# Patient Record
Sex: MALE | Race: WHITE | Employment: OTHER | URBAN - NONMETROPOLITAN AREA
[De-identification: names, ages, dates, MRNs, and addresses within clinical notes are randomized per-mention and may not be internally consistent; named-entity substitution may affect disease eponyms.]

---

## 2018-02-07 ENCOUNTER — HOSPITAL ENCOUNTER (INPATIENT)
Age: 83
LOS: 4 days | Discharge: HOME | DRG: 641 | End: 2018-02-11
Attending: INTERNAL MEDICINE | Admitting: INTERNAL MEDICINE
Payer: MEDICARE

## 2018-02-07 PROBLEM — E87.1 HYPONATREMIA: Status: ACTIVE | Noted: 2018-02-07

## 2018-02-07 LAB
ALBUMIN SERPL-MCNC: 3.8 G/DL (ref 3.5–5.2)
ALP BLD-CCNC: 72 U/L (ref 40–130)
ALT SERPL-CCNC: 9 U/L (ref 5–41)
AMORPHOUS: ABNORMAL /HPF
ANION GAP SERPL CALCULATED.3IONS-SCNC: 10 MMOL/L (ref 7–19)
ANION GAP SERPL CALCULATED.3IONS-SCNC: 11 MMOL/L (ref 7–19)
ANION GAP SERPL CALCULATED.3IONS-SCNC: 8 MMOL/L (ref 7–19)
AST SERPL-CCNC: 18 U/L (ref 5–40)
BACTERIA: ABNORMAL /HPF
BASOPHILS ABSOLUTE: 0.1 K/UL (ref 0–0.2)
BASOPHILS RELATIVE PERCENT: 0.4 % (ref 0–1)
BILIRUB SERPL-MCNC: 0.6 MG/DL (ref 0.2–1.2)
BILIRUBIN URINE: NEGATIVE
BLOOD, URINE: ABNORMAL
BUN BLDV-MCNC: 10 MG/DL (ref 8–23)
CALCIUM SERPL-MCNC: 8 MG/DL (ref 8.8–10.2)
CALCIUM SERPL-MCNC: 8 MG/DL (ref 8.8–10.2)
CALCIUM SERPL-MCNC: 8.1 MG/DL (ref 8.8–10.2)
CHLORIDE BLD-SCNC: 82 MMOL/L (ref 98–111)
CHLORIDE BLD-SCNC: 83 MMOL/L (ref 98–111)
CHLORIDE BLD-SCNC: 83 MMOL/L (ref 98–111)
CHLORIDE BLD-SCNC: 86 MMOL/L (ref 98–111)
CHLORIDE BLD-SCNC: 88 MMOL/L (ref 98–111)
CLARITY: ABNORMAL
CO2: 25 MMOL/L (ref 22–29)
CO2: 26 MMOL/L (ref 22–29)
CO2: 27 MMOL/L (ref 22–29)
COLOR: YELLOW
CORTISOL TOTAL: 13.2 UG/DL
CREAT SERPL-MCNC: 0.6 MG/DL (ref 0.5–1.2)
CREAT SERPL-MCNC: 0.6 MG/DL (ref 0.5–1.2)
CREAT SERPL-MCNC: 0.7 MG/DL (ref 0.5–1.2)
EOSINOPHILS ABSOLUTE: 0.6 K/UL (ref 0–0.6)
EOSINOPHILS RELATIVE PERCENT: 4.2 % (ref 0–5)
GFR NON-AFRICAN AMERICAN: >60
GLUCOSE BLD-MCNC: 117 MG/DL (ref 74–109)
GLUCOSE BLD-MCNC: 124 MG/DL (ref 74–109)
GLUCOSE BLD-MCNC: 125 MG/DL (ref 74–109)
GLUCOSE BLD-MCNC: 127 MG/DL (ref 74–109)
GLUCOSE BLD-MCNC: 156 MG/DL (ref 74–109)
GLUCOSE URINE: NEGATIVE MG/DL
HCT VFR BLD CALC: 31.8 % (ref 42–52)
HEMOGLOBIN: 11 G/DL (ref 14–18)
KETONES, URINE: NEGATIVE MG/DL
LEUKOCYTE ESTERASE, URINE: ABNORMAL
LYMPHOCYTES ABSOLUTE: 0.6 K/UL (ref 1.1–4.5)
LYMPHOCYTES RELATIVE PERCENT: 4.3 % (ref 20–40)
MAGNESIUM: 1.6 MG/DL (ref 1.6–2.4)
MCH RBC QN AUTO: 31.6 PG (ref 27–31)
MCHC RBC AUTO-ENTMCNC: 34.6 G/DL (ref 33–37)
MCV RBC AUTO: 91.4 FL (ref 80–94)
MONOCYTES ABSOLUTE: 0.9 K/UL (ref 0–0.9)
MONOCYTES RELATIVE PERCENT: 5.9 % (ref 0–10)
MUCUS: ABNORMAL /LPF
NEUTROPHILS ABSOLUTE: 12.4 K/UL (ref 1.5–7.5)
NEUTROPHILS RELATIVE PERCENT: 84.8 % (ref 50–65)
NITRITE, URINE: NEGATIVE
OSMOLALITY URINE: 518 MOSM/KG (ref 250–1200)
OSMOLALITY: 248 MOSM/KG (ref 275–300)
PDW BLD-RTO: 11.8 % (ref 11.5–14.5)
PH UA: 7.5
PHENYTOIN LEVEL: 8.7 UG/ML (ref 10–20)
PLATELET # BLD: 230 K/UL (ref 130–400)
PMV BLD AUTO: 9.1 FL (ref 9.4–12.4)
POTASSIUM REFLEX MAGNESIUM: 4.2 MMOL/L (ref 3.5–5)
POTASSIUM REFLEX MAGNESIUM: 4.6 MMOL/L (ref 3.5–5)
POTASSIUM REFLEX MAGNESIUM: 4.8 MMOL/L (ref 3.5–5)
POTASSIUM REFLEX MAGNESIUM: 4.8 MMOL/L (ref 3.5–5)
POTASSIUM SERPL-SCNC: 4.2 MMOL/L (ref 3.5–5)
PROTEIN UA: 100 MG/DL
RBC # BLD: 3.48 M/UL (ref 4.7–6.1)
RBC UA: ABNORMAL /HPF (ref 0–2)
SODIUM BLD-SCNC: 118 MMOL/L (ref 136–145)
SODIUM BLD-SCNC: 118 MMOL/L (ref 136–145)
SODIUM BLD-SCNC: 120 MMOL/L (ref 136–145)
SODIUM BLD-SCNC: 121 MMOL/L (ref 136–145)
SODIUM BLD-SCNC: 121 MMOL/L (ref 136–145)
SODIUM BLD-SCNC: 122 MMOL/L (ref 136–145)
SODIUM BLD-SCNC: 124 MMOL/L (ref 136–145)
SODIUM URINE: 133 MMOL/L
SPECIFIC GRAVITY UA: 1.02
TOTAL PROTEIN: 6.4 G/DL (ref 6.6–8.7)
TRIGL SERPL-MCNC: 74 MG/DL (ref 0–149)
TROPONIN: <0.01 NG/ML (ref 0–0.03)
TROPONIN: <0.01 NG/ML (ref 0–0.03)
TSH SERPL DL<=0.05 MIU/L-ACNC: 3.23 UIU/ML (ref 0.27–4.2)
UROBILINOGEN, URINE: 0.2 E.U./DL
WBC # BLD: 14.7 K/UL (ref 4.8–10.8)
WBC UA: ABNORMAL /HPF (ref 0–5)

## 2018-02-07 PROCEDURE — 84295 ASSAY OF SERUM SODIUM: CPT

## 2018-02-07 PROCEDURE — 2580000003 HC RX 258: Performed by: INTERNAL MEDICINE

## 2018-02-07 PROCEDURE — 99222 1ST HOSP IP/OBS MODERATE 55: CPT | Performed by: UROLOGY

## 2018-02-07 PROCEDURE — 94640 AIRWAY INHALATION TREATMENT: CPT

## 2018-02-07 PROCEDURE — 6370000000 HC RX 637 (ALT 250 FOR IP): Performed by: INTERNAL MEDICINE

## 2018-02-07 PROCEDURE — 81001 URINALYSIS AUTO W/SCOPE: CPT

## 2018-02-07 PROCEDURE — 6370000000 HC RX 637 (ALT 250 FOR IP): Performed by: UROLOGY

## 2018-02-07 PROCEDURE — 85025 COMPLETE CBC W/AUTO DIFF WBC: CPT

## 2018-02-07 PROCEDURE — 82533 TOTAL CORTISOL: CPT

## 2018-02-07 PROCEDURE — 87086 URINE CULTURE/COLONY COUNT: CPT

## 2018-02-07 PROCEDURE — 83735 ASSAY OF MAGNESIUM: CPT

## 2018-02-07 PROCEDURE — 99291 CRITICAL CARE FIRST HOUR: CPT | Performed by: INTERNAL MEDICINE

## 2018-02-07 PROCEDURE — 84300 ASSAY OF URINE SODIUM: CPT

## 2018-02-07 PROCEDURE — 84484 ASSAY OF TROPONIN QUANT: CPT

## 2018-02-07 PROCEDURE — 6360000002 HC RX W HCPCS: Performed by: INTERNAL MEDICINE

## 2018-02-07 PROCEDURE — 2000000000 HC ICU R&B

## 2018-02-07 PROCEDURE — 2700000000 HC OXYGEN THERAPY PER DAY

## 2018-02-07 PROCEDURE — 83935 ASSAY OF URINE OSMOLALITY: CPT

## 2018-02-07 PROCEDURE — 80053 COMPREHEN METABOLIC PANEL: CPT

## 2018-02-07 PROCEDURE — 84478 ASSAY OF TRIGLYCERIDES: CPT

## 2018-02-07 PROCEDURE — 83930 ASSAY OF BLOOD OSMOLALITY: CPT

## 2018-02-07 PROCEDURE — 84443 ASSAY THYROID STIM HORMONE: CPT

## 2018-02-07 PROCEDURE — 36415 COLL VENOUS BLD VENIPUNCTURE: CPT

## 2018-02-07 PROCEDURE — 87070 CULTURE OTHR SPECIMN AEROBIC: CPT

## 2018-02-07 PROCEDURE — 80185 ASSAY OF PHENYTOIN TOTAL: CPT

## 2018-02-07 RX ORDER — FINASTERIDE 5 MG/1
5 TABLET, FILM COATED ORAL DAILY
Status: DISCONTINUED | OUTPATIENT
Start: 2018-02-07 | End: 2018-02-11 | Stop reason: HOSPADM

## 2018-02-07 RX ORDER — TAMSULOSIN HYDROCHLORIDE 0.4 MG/1
0.8 CAPSULE ORAL DAILY
Status: DISCONTINUED | OUTPATIENT
Start: 2018-02-08 | End: 2018-02-07

## 2018-02-07 RX ORDER — PANTOPRAZOLE SODIUM 40 MG/1
40 GRANULE, DELAYED RELEASE ORAL
Status: DISCONTINUED | OUTPATIENT
Start: 2018-02-07 | End: 2018-02-07

## 2018-02-07 RX ORDER — PHENYTOIN SODIUM 100 MG/1
200 CAPSULE, EXTENDED RELEASE ORAL 2 TIMES DAILY
Status: DISCONTINUED | OUTPATIENT
Start: 2018-02-07 | End: 2018-02-11 | Stop reason: HOSPADM

## 2018-02-07 RX ORDER — DOCUSATE SODIUM 100 MG/1
100 CAPSULE, LIQUID FILLED ORAL 2 TIMES DAILY
Status: DISCONTINUED | OUTPATIENT
Start: 2018-02-07 | End: 2018-02-09

## 2018-02-07 RX ORDER — METOPROLOL TARTRATE 50 MG/1
50 TABLET, FILM COATED ORAL 2 TIMES DAILY
COMMUNITY

## 2018-02-07 RX ORDER — AMLODIPINE BESYLATE 10 MG/1
10 TABLET ORAL DAILY
Status: DISCONTINUED | OUTPATIENT
Start: 2018-02-07 | End: 2018-02-11 | Stop reason: HOSPADM

## 2018-02-07 RX ORDER — PANTOPRAZOLE SODIUM 40 MG/1
40 TABLET, DELAYED RELEASE ORAL
Status: DISCONTINUED | OUTPATIENT
Start: 2018-02-07 | End: 2018-02-11 | Stop reason: HOSPADM

## 2018-02-07 RX ORDER — PHENYTOIN SODIUM 100 MG/1
200 CAPSULE, EXTENDED RELEASE ORAL 2 TIMES DAILY
COMMUNITY

## 2018-02-07 RX ORDER — ASPIRIN 81 MG/1
81 TABLET ORAL DAILY
Status: DISCONTINUED | OUTPATIENT
Start: 2018-02-07 | End: 2018-02-11 | Stop reason: HOSPADM

## 2018-02-07 RX ORDER — SODIUM CHLORIDE 0.9 % (FLUSH) 0.9 %
10 SYRINGE (ML) INJECTION EVERY 12 HOURS SCHEDULED
Status: DISCONTINUED | OUTPATIENT
Start: 2018-02-07 | End: 2018-02-11 | Stop reason: HOSPADM

## 2018-02-07 RX ORDER — SODIUM CHLORIDE 0.9 % (FLUSH) 0.9 %
10 SYRINGE (ML) INJECTION PRN
Status: DISCONTINUED | OUTPATIENT
Start: 2018-02-07 | End: 2018-02-11 | Stop reason: HOSPADM

## 2018-02-07 RX ORDER — POTASSIUM CHLORIDE 7.45 MG/ML
10 INJECTION INTRAVENOUS PRN
Status: DISCONTINUED | OUTPATIENT
Start: 2018-02-07 | End: 2018-02-07

## 2018-02-07 RX ORDER — 3% SODIUM CHLORIDE 3 G/100ML
50 INJECTION, SOLUTION INTRAVENOUS CONTINUOUS
Status: DISPENSED | OUTPATIENT
Start: 2018-02-07 | End: 2018-02-07

## 2018-02-07 RX ORDER — ONDANSETRON 2 MG/ML
4 INJECTION INTRAMUSCULAR; INTRAVENOUS EVERY 6 HOURS PRN
Status: DISCONTINUED | OUTPATIENT
Start: 2018-02-07 | End: 2018-02-11 | Stop reason: HOSPADM

## 2018-02-07 RX ORDER — TAMSULOSIN HYDROCHLORIDE 0.4 MG/1
0.8 CAPSULE ORAL DAILY
Status: DISCONTINUED | OUTPATIENT
Start: 2018-02-07 | End: 2018-02-11 | Stop reason: HOSPADM

## 2018-02-07 RX ORDER — ALPRAZOLAM 1 MG/1
1 TABLET ORAL 4 TIMES DAILY PRN
COMMUNITY

## 2018-02-07 RX ORDER — ASPIRIN 81 MG/1
81 TABLET ORAL DAILY
COMMUNITY

## 2018-02-07 RX ORDER — TAMSULOSIN HYDROCHLORIDE 0.4 MG/1
0.4 CAPSULE ORAL DAILY
Status: DISCONTINUED | OUTPATIENT
Start: 2018-02-07 | End: 2018-02-07

## 2018-02-07 RX ORDER — FENTANYL CITRATE 50 UG/ML
25 INJECTION, SOLUTION INTRAMUSCULAR; INTRAVENOUS ONCE
Status: COMPLETED | OUTPATIENT
Start: 2018-02-07 | End: 2018-02-07

## 2018-02-07 RX ORDER — SPIRONOLACTONE 25 MG/1
25 TABLET ORAL DAILY
Status: ON HOLD | COMMUNITY
End: 2018-02-11 | Stop reason: HOSPADM

## 2018-02-07 RX ORDER — IPRATROPIUM BROMIDE AND ALBUTEROL SULFATE 2.5; .5 MG/3ML; MG/3ML
1 SOLUTION RESPIRATORY (INHALATION) 4 TIMES DAILY
Status: DISCONTINUED | OUTPATIENT
Start: 2018-02-07 | End: 2018-02-11 | Stop reason: HOSPADM

## 2018-02-07 RX ORDER — PANTOPRAZOLE SODIUM 40 MG/1
40 GRANULE, DELAYED RELEASE ORAL
COMMUNITY
End: 2018-02-14 | Stop reason: SDUPTHER

## 2018-02-07 RX ORDER — HYDROCODONE BITARTRATE AND ACETAMINOPHEN 10; 325 MG/1; MG/1
1 TABLET ORAL EVERY 6 HOURS PRN
Status: DISCONTINUED | OUTPATIENT
Start: 2018-02-07 | End: 2018-02-11 | Stop reason: HOSPADM

## 2018-02-07 RX ORDER — BISACODYL 10 MG
10 SUPPOSITORY, RECTAL RECTAL DAILY PRN
Status: DISCONTINUED | OUTPATIENT
Start: 2018-02-07 | End: 2018-02-11 | Stop reason: HOSPADM

## 2018-02-07 RX ORDER — HYDROCODONE BITARTRATE AND ACETAMINOPHEN 10; 325 MG/1; MG/1
1 TABLET ORAL EVERY 6 HOURS PRN
COMMUNITY

## 2018-02-07 RX ORDER — PNV NO.95/FERROUS FUM/FOLIC AC 28MG-0.8MG
1000 TABLET ORAL DAILY
COMMUNITY

## 2018-02-07 RX ORDER — POTASSIUM CHLORIDE 29.8 MG/ML
20 INJECTION INTRAVENOUS PRN
Status: DISCONTINUED | OUTPATIENT
Start: 2018-02-07 | End: 2018-02-07

## 2018-02-07 RX ORDER — AMLODIPINE BESYLATE 10 MG/1
10 TABLET ORAL DAILY
COMMUNITY

## 2018-02-07 RX ORDER — ACETAMINOPHEN 325 MG/1
650 TABLET ORAL EVERY 4 HOURS PRN
Status: DISCONTINUED | OUTPATIENT
Start: 2018-02-07 | End: 2018-02-11 | Stop reason: HOSPADM

## 2018-02-07 RX ORDER — ALPRAZOLAM 1 MG/1
1 TABLET ORAL 4 TIMES DAILY PRN
Status: DISCONTINUED | OUTPATIENT
Start: 2018-02-07 | End: 2018-02-11 | Stop reason: HOSPADM

## 2018-02-07 RX ORDER — TAMSULOSIN HYDROCHLORIDE 0.4 MG/1
0.4 CAPSULE ORAL DAILY
COMMUNITY

## 2018-02-07 RX ORDER — MAGNESIUM SULFATE 1 G/100ML
1 INJECTION INTRAVENOUS PRN
Status: DISCONTINUED | OUTPATIENT
Start: 2018-02-07 | End: 2018-02-07

## 2018-02-07 RX ADMIN — DOCUSATE SODIUM 100 MG: 100 CAPSULE, LIQUID FILLED ORAL at 08:48

## 2018-02-07 RX ADMIN — PHENYTOIN SODIUM 200 MG: 100 CAPSULE ORAL at 20:48

## 2018-02-07 RX ADMIN — ALPRAZOLAM 1 MG: 1 TABLET ORAL at 12:59

## 2018-02-07 RX ADMIN — IPRATROPIUM BROMIDE AND ALBUTEROL SULFATE 1 AMPULE: .5; 3 SOLUTION RESPIRATORY (INHALATION) at 19:37

## 2018-02-07 RX ADMIN — ALPRAZOLAM 1 MG: 1 TABLET ORAL at 22:04

## 2018-02-07 RX ADMIN — Medication 10 ML: at 20:48

## 2018-02-07 RX ADMIN — TAMSULOSIN HYDROCHLORIDE 0.4 MG: 0.4 CAPSULE ORAL at 08:48

## 2018-02-07 RX ADMIN — Medication 10 ML: at 08:47

## 2018-02-07 RX ADMIN — IPRATROPIUM BROMIDE AND ALBUTEROL SULFATE 1 AMPULE: .5; 3 SOLUTION RESPIRATORY (INHALATION) at 09:58

## 2018-02-07 RX ADMIN — TAMSULOSIN HYDROCHLORIDE 0.8 MG: 0.4 CAPSULE ORAL at 12:19

## 2018-02-07 RX ADMIN — HYDROCODONE BITARTRATE AND ACETAMINOPHEN 1 TABLET: 10; 325 TABLET ORAL at 02:29

## 2018-02-07 RX ADMIN — ALPRAZOLAM 1 MG: 1 TABLET ORAL at 02:29

## 2018-02-07 RX ADMIN — SODIUM CHLORIDE 50 ML/HR: 3 INJECTION, SOLUTION INTRAVENOUS at 16:17

## 2018-02-07 RX ADMIN — HYDROCODONE BITARTRATE AND ACETAMINOPHEN 1 TABLET: 10; 325 TABLET ORAL at 20:48

## 2018-02-07 RX ADMIN — AMLODIPINE BESYLATE 10 MG: 10 TABLET ORAL at 08:48

## 2018-02-07 RX ADMIN — PANTOPRAZOLE SODIUM 40 MG: 40 TABLET, DELAYED RELEASE ORAL at 08:39

## 2018-02-07 RX ADMIN — SODIUM CHLORIDE 50 ML/HR: 3 INJECTION, SOLUTION INTRAVENOUS at 16:57

## 2018-02-07 RX ADMIN — ASPIRIN 81 MG: 81 TABLET, COATED ORAL at 08:48

## 2018-02-07 RX ADMIN — DOCUSATE SODIUM 100 MG: 100 CAPSULE, LIQUID FILLED ORAL at 20:48

## 2018-02-07 RX ADMIN — ENOXAPARIN SODIUM 40 MG: 40 INJECTION SUBCUTANEOUS at 08:47

## 2018-02-07 RX ADMIN — ONDANSETRON 4 MG: 2 INJECTION INTRAMUSCULAR; INTRAVENOUS at 02:17

## 2018-02-07 RX ADMIN — FENTANYL CITRATE 25 MCG: 50 INJECTION INTRAMUSCULAR; INTRAVENOUS at 04:13

## 2018-02-07 RX ADMIN — PHENYTOIN SODIUM 200 MG: 100 CAPSULE ORAL at 08:47

## 2018-02-07 RX ADMIN — FINASTERIDE 5 MG: 5 TABLET, FILM COATED ORAL at 12:19

## 2018-02-07 RX ADMIN — IPRATROPIUM BROMIDE AND ALBUTEROL SULFATE 1 AMPULE: .5; 3 SOLUTION RESPIRATORY (INHALATION) at 14:05

## 2018-02-07 RX ADMIN — HYDROCODONE BITARTRATE AND ACETAMINOPHEN 1 TABLET: 10; 325 TABLET ORAL at 08:39

## 2018-02-07 RX ADMIN — SODIUM CHLORIDE 50 ML/HR: 3 INJECTION, SOLUTION INTRAVENOUS at 10:19

## 2018-02-07 ASSESSMENT — PAIN DESCRIPTION - PROGRESSION: CLINICAL_PROGRESSION: GRADUALLY WORSENING

## 2018-02-07 ASSESSMENT — ENCOUNTER SYMPTOMS
BACK PAIN: 1
NAUSEA: 0
PHOTOPHOBIA: 0
HEARTBURN: 0
DOUBLE VISION: 0
DIARRHEA: 0
RESPIRATORY NEGATIVE: 1
VOMITING: 0
ABDOMINAL PAIN: 0
BLURRED VISION: 0
ORTHOPNEA: 0
EYE PAIN: 0
HEMOPTYSIS: 0
COUGH: 0
SPUTUM PRODUCTION: 0

## 2018-02-07 ASSESSMENT — PAIN DESCRIPTION - LOCATION: LOCATION: BACK

## 2018-02-07 ASSESSMENT — PAIN SCALES - GENERAL
PAINLEVEL_OUTOF10: 0
PAINLEVEL_OUTOF10: 8
PAINLEVEL_OUTOF10: 7
PAINLEVEL_OUTOF10: 0
PAINLEVEL_OUTOF10: 6
PAINLEVEL_OUTOF10: 10
PAINLEVEL_OUTOF10: 0
PAINLEVEL_OUTOF10: 7
PAINLEVEL_OUTOF10: 9

## 2018-02-07 ASSESSMENT — PAIN DESCRIPTION - PAIN TYPE: TYPE: CHRONIC PAIN

## 2018-02-07 ASSESSMENT — PAIN DESCRIPTION - ONSET: ONSET: GRADUAL

## 2018-02-07 ASSESSMENT — PAIN DESCRIPTION - FREQUENCY: FREQUENCY: INTERMITTENT

## 2018-02-07 ASSESSMENT — PAIN DESCRIPTION - DESCRIPTORS: DESCRIPTORS: DISCOMFORT;SPASM

## 2018-02-07 NOTE — PROGRESS NOTES
Called Urology consult to Talya Rolle at the office. She stated Dr. Jeffery Clark was not on call today. He may not be down to see the patient today since he is not on call but she will still let him know of the consult.

## 2018-02-07 NOTE — CONSULTS
[facility]                   [address]                                   CONSULTATION    PATIENT NAME: Tejal Rome                        :        1935  MED REC NO:   [id number]                              ROOM:       [facility]  ACCOUNT NO:   [de-identified]                           ADMIT DATE: 2018  PROVIDER:     Tracy Kirkland MD    CONSULT DATE:  2018    REASON FOR CONSULTATION:  1. BPH with obstructive lower urinary tract symptoms. 2.  Urinary retention. HISTORY:  The patient is an 81-year-old gentleman. He says, over the last  2 to 3 months, he has been having symptoms of incomplete emptying, small  frequent voids and bladder distention. He says that he initially went to  [address] [place] [address]. His local medical doctor started him on some Flomax and they  put a catheter in in [address] [place] [address]. They initially took the catheter out  and he was unable to urinate. They told him to double up on his Flomax,  which did not improve and he started having pain across his lower abdomen. He denies any flank pain. He denies any kidney stones or hematuria. No  dysuria. Just feelings of incomplete emptying, small frequent voids and  urinary retention. He went to [facility], where they put a Day  catheter in and by report had 200 mL residual.  He also was found to have  pretty profound hyponatremia. He denies any fluid losses such as diarrhea  or vomiting. He denies any fluid overload with lower extremity swelling or  weight gain, but he does report trying to drink lots of water and cranberry  juice. He also states he has had CVA in the past.  He would rate his  urinary symptoms severe and continuous. PAST MEDICAL HISTORY:  1. Coronary artery disease. 2.  CVA. 3.  COPD. 4.  Hyperlipidemia. 5.  Hypertension. 6.  Myocardial infarction status post cardiac stent. 7.  BPH. PAST SURGICAL HISTORY:  1. Cholecystectomy.   2.  Left

## 2018-02-07 NOTE — H&P
All other components within normal limits   MRSA SCREENING CULTURE ONLY   OSMOLALITY, URINE   MAGNESIUM   SODIUM, URINE, RANDOM   TROPONIN   TSH WITHOUT REFLEX   TRIGLYCERIDES   TROPONIN   BASIC METABOLIC PANEL W/ REFLEX TO MG FOR LOW K    BASIC METABOLIC PANEL W/ REFLEX TO MG FOR LOW K    BASIC METABOLIC PANEL W/ REFLEX TO MG FOR LOW K    URINALYSIS          IMPRESSION:  1.  severe hyponatremia  2. Abdominal pain  3. Urinary retention    PLAN:   1.  serial sodium  2. Work up for hyponatremia  3. And treat based upon results, he is not encephalopathic at this time  4. Nephrology consult  5. Avoid rapid correction. 6. Review home medications  7.  Urology evaluation    cct 35 minutes    Vinnie Ross MD    Internal Medicine Hospitalist

## 2018-02-07 NOTE — PROGRESS NOTES
I called Urology answering service and they say there is no one taking new consults today. I will try back when the office opens at 0800 for confirmation.

## 2018-02-07 NOTE — CONSULTS
Nephrology ([address] [facility]) Consult Note      Patient:  Domenic Dejesus  YOB: 1935  Date of Service: 2/7/2018  MRN: [id number]   Acct: [de-identified]   Primary Care Physician: No primary care provider on file. Advance Directive: Full Code  Admit Date: 2/7/2018       Hospital Day: 0  Referring Provider: Iesha Payton MD    Patient independently seen and examined, Chart, Consults, Notes, Operative notes, Labs, Cardiology, and Radiology studies reviewed as able. Subjective: Domenic Dejesus is a 81 y.o. male  whom we were consulted for hyponatremia. Pt unaware of any prior renal/sodium issues. Several weeks of worsening difficulties with LUTS. Some \"kidney pain\" as well. No dizzy/weak/soa/n/v.  Long smoking h/o. No thyroid issues.       Allergies:  Prednisone and Statins    Medicines:  Current Facility-Administered Medications   Medication Dose Route Frequency Provider Last Rate Last Dose    sodium chloride flush 0.9 % injection 10 mL  10 mL Intravenous 2 times per day Iesha Payton MD   10 mL at 02/07/18 0847    sodium chloride flush 0.9 % injection 10 mL  10 mL Intravenous PRN Iesha Payton MD        acetaminophen (TYLENOL) tablet 650 mg  650 mg Oral Q4H PRN Richi Hyman MD        docusate sodium (COLACE) capsule 100 mg  100 mg Oral BID Rivera Vale Hyman MD   100 mg at 02/07/18 0848    bisacodyl (DULCOLAX) suppository 10 mg  10 mg Rectal Daily PRN Iesha Payton MD        ondansetron (ZOFRAN) injection 4 mg  4 mg Intravenous Q6H PRN Richi Hymna MD   4 mg at 02/07/18 0217    enoxaparin (LOVENOX) injection 40 mg  40 mg Subcutaneous Daily Richi Hyman MD   40 mg at 02/07/18 0847    ipratropium-albuterol (DUONEB) nebulizer solution 1 ampule  1 ampule Inhalation 4x daily Richi Hyman MD        ALPRAZolam Rodney Gregorio) tablet 1 mg  1 mg Oral 4x Daily PRN Iesha Payton MD   1 mg at 02/07/18 0229    amLODIPine (NORVASC) tablet 10 mg  10 mg Oral Daily Richi last 72 hours. ABGs: No results for input(s): PH, PCO2, PO2, HCO3, O2SAT in the last 72 hours. CRP:  No results for input(s): CRP in the last 72 hours. Sed Rate:  No results for input(s): SEDRATE in the last 72 hours. Cultures:   No results for input(s): CULTURE in the last 72 hours. No results for input(s): BC, [place] in the last 72 hours. No results for input(s): CXSURG in the last 72 hours. Radiology reports as per the Radiologist  Radiology: No results found. Assessment   Severe hyponatremia  Urinary retention with bladder outlet obstruction   HTN  Anemia    Plan:  D/w pt/RN  W/u ordered and ongoing  Monitor na frequently  Goal correction of 6 meq per day  Use 3% saline given obstructive issues  Urology to see      Thank you for the consult, we appreciate the opportunity to provide care to your patients. Feel free to contact me if I can be of any further assistance.       Leila Aldrich MD  02/07/18  9:35 AM

## 2018-02-07 NOTE — CONSULTS
Inpatient consult to Urology  Consult performed by: Osiris Hayden  Consult ordered by: Win Garcia      [id number]

## 2018-02-07 NOTE — PROGRESS NOTES
Hospitalist progress note          S:  Still some mild lower abdominal discomfort. No new complaints. ROS:  Gen.: No  fever or chills  Cardiovascular: No  chest pain or palpitations  Pulmonary: No  shortness of breath or productive coughing  Gastrointestinal: No nausea, vomiting or diarrhea  Neurologic: No  focal numbness or weakness      Exam:  Vital signs: T 97.8, RR 16, P 62, /61  General: in no distress  Pulmonary: Lungs clear, unlabored breathing  Cardiovascular: Heart regular without murmur, trace pretibial edema  Gastrointestinal: Abdomen soft, nontender, no guarding or masses  Neurologic: Alert, no focal motor deficits  Skin: Warm and dry. No rash. Genitourinary: Day catheter in place with a few small blood clots in tube    Labs:  Sodium 118, serum osmolality 248, urine sodium 133, urine osmolality 518    Assessment and plan:    Hypotonic hyponatremia: Possibly secondary to Aldactone. Hold Aldactone. Nephrology is consulted. Urinary retention: Urology consulted but reportedly not available today. Continue Day catheter    Seizure disorder:  On Dilantin    COPD: Stable    Coronary artery disease: Stable    Hypertension: Controlled    History of CVA    Chronic pain syndrome

## 2018-02-08 ENCOUNTER — APPOINTMENT (OUTPATIENT)
Dept: GENERAL RADIOLOGY | Age: 83
DRG: 641 | End: 2018-02-08
Attending: INTERNAL MEDICINE
Payer: MEDICARE

## 2018-02-08 LAB
ANION GAP SERPL CALCULATED.3IONS-SCNC: 11 MMOL/L (ref 7–19)
ANION GAP SERPL CALCULATED.3IONS-SCNC: 12 MMOL/L (ref 7–19)
ANION GAP SERPL CALCULATED.3IONS-SCNC: 9 MMOL/L (ref 7–19)
BASOPHILS ABSOLUTE: 0.1 K/UL (ref 0–0.2)
BASOPHILS RELATIVE PERCENT: 0.5 % (ref 0–1)
BUN BLDV-MCNC: 10 MG/DL (ref 8–23)
BUN BLDV-MCNC: 11 MG/DL (ref 8–23)
BUN BLDV-MCNC: 12 MG/DL (ref 8–23)
CALCIUM SERPL-MCNC: 7.9 MG/DL (ref 8.8–10.2)
CALCIUM SERPL-MCNC: 8 MG/DL (ref 8.8–10.2)
CALCIUM SERPL-MCNC: 8.5 MG/DL (ref 8.8–10.2)
CHLORIDE BLD-SCNC: 89 MMOL/L (ref 98–111)
CHLORIDE BLD-SCNC: 89 MMOL/L (ref 98–111)
CHLORIDE BLD-SCNC: 90 MMOL/L (ref 98–111)
CO2: 26 MMOL/L (ref 22–29)
CO2: 26 MMOL/L (ref 22–29)
CO2: 27 MMOL/L (ref 22–29)
CREAT SERPL-MCNC: 0.7 MG/DL (ref 0.5–1.2)
CREAT SERPL-MCNC: 0.8 MG/DL (ref 0.5–1.2)
CREAT SERPL-MCNC: 0.9 MG/DL (ref 0.5–1.2)
EOSINOPHILS ABSOLUTE: 1.1 K/UL (ref 0–0.6)
EOSINOPHILS RELATIVE PERCENT: 12.2 % (ref 0–5)
GFR NON-AFRICAN AMERICAN: >60
GLUCOSE BLD-MCNC: 154 MG/DL (ref 74–109)
GLUCOSE BLD-MCNC: 95 MG/DL (ref 74–109)
GLUCOSE BLD-MCNC: 96 MG/DL (ref 74–109)
HCT VFR BLD CALC: 31.2 % (ref 42–52)
HEMOGLOBIN: 10.7 G/DL (ref 14–18)
LYMPHOCYTES ABSOLUTE: 1 K/UL (ref 1.1–4.5)
LYMPHOCYTES RELATIVE PERCENT: 10.8 % (ref 20–40)
MCH RBC QN AUTO: 31.8 PG (ref 27–31)
MCHC RBC AUTO-ENTMCNC: 34.3 G/DL (ref 33–37)
MCV RBC AUTO: 92.6 FL (ref 80–94)
MONOCYTES ABSOLUTE: 0.9 K/UL (ref 0–0.9)
MONOCYTES RELATIVE PERCENT: 9.9 % (ref 0–10)
MRSA CULTURE ONLY: NORMAL
NEUTROPHILS ABSOLUTE: 6.1 K/UL (ref 1.5–7.5)
NEUTROPHILS RELATIVE PERCENT: 66.2 % (ref 50–65)
PDW BLD-RTO: 12.1 % (ref 11.5–14.5)
PLATELET # BLD: 214 K/UL (ref 130–400)
PMV BLD AUTO: 9 FL (ref 9.4–12.4)
POTASSIUM REFLEX MAGNESIUM: 3.9 MMOL/L (ref 3.5–5)
POTASSIUM REFLEX MAGNESIUM: 4.2 MMOL/L (ref 3.5–5)
POTASSIUM REFLEX MAGNESIUM: 4.3 MMOL/L (ref 3.5–5)
RBC # BLD: 3.37 M/UL (ref 4.7–6.1)
SODIUM BLD-SCNC: 126 MMOL/L (ref 136–145)
SODIUM BLD-SCNC: 126 MMOL/L (ref 136–145)
SODIUM BLD-SCNC: 127 MMOL/L (ref 136–145)
WBC # BLD: 9.2 K/UL (ref 4.8–10.8)

## 2018-02-08 PROCEDURE — 2580000003 HC RX 258: Performed by: INTERNAL MEDICINE

## 2018-02-08 PROCEDURE — 6370000000 HC RX 637 (ALT 250 FOR IP): Performed by: INTERNAL MEDICINE

## 2018-02-08 PROCEDURE — 80048 BASIC METABOLIC PNL TOTAL CA: CPT

## 2018-02-08 PROCEDURE — 71046 X-RAY EXAM CHEST 2 VIEWS: CPT

## 2018-02-08 PROCEDURE — 6370000000 HC RX 637 (ALT 250 FOR IP): Performed by: UROLOGY

## 2018-02-08 PROCEDURE — 6360000002 HC RX W HCPCS: Performed by: INTERNAL MEDICINE

## 2018-02-08 PROCEDURE — 99231 SBSQ HOSP IP/OBS SF/LOW 25: CPT | Performed by: PHYSICIAN ASSISTANT

## 2018-02-08 PROCEDURE — 2700000000 HC OXYGEN THERAPY PER DAY

## 2018-02-08 PROCEDURE — 94640 AIRWAY INHALATION TREATMENT: CPT

## 2018-02-08 PROCEDURE — 36415 COLL VENOUS BLD VENIPUNCTURE: CPT

## 2018-02-08 PROCEDURE — 85025 COMPLETE CBC W/AUTO DIFF WBC: CPT

## 2018-02-08 PROCEDURE — 99232 SBSQ HOSP IP/OBS MODERATE 35: CPT | Performed by: INTERNAL MEDICINE

## 2018-02-08 PROCEDURE — 1210000000 HC MED SURG R&B

## 2018-02-08 PROCEDURE — 84295 ASSAY OF SERUM SODIUM: CPT

## 2018-02-08 RX ADMIN — AMLODIPINE BESYLATE 10 MG: 10 TABLET ORAL at 08:41

## 2018-02-08 RX ADMIN — Medication 10 ML: at 21:25

## 2018-02-08 RX ADMIN — HYDROCODONE BITARTRATE AND ACETAMINOPHEN 1 TABLET: 10; 325 TABLET ORAL at 04:12

## 2018-02-08 RX ADMIN — IPRATROPIUM BROMIDE AND ALBUTEROL SULFATE 1 AMPULE: .5; 3 SOLUTION RESPIRATORY (INHALATION) at 13:56

## 2018-02-08 RX ADMIN — FINASTERIDE 5 MG: 5 TABLET, FILM COATED ORAL at 08:41

## 2018-02-08 RX ADMIN — ASPIRIN 81 MG: 81 TABLET, COATED ORAL at 08:42

## 2018-02-08 RX ADMIN — PHENYTOIN SODIUM 200 MG: 100 CAPSULE ORAL at 21:24

## 2018-02-08 RX ADMIN — PHENYTOIN SODIUM 200 MG: 100 CAPSULE ORAL at 08:41

## 2018-02-08 RX ADMIN — ALPRAZOLAM 1 MG: 1 TABLET ORAL at 11:03

## 2018-02-08 RX ADMIN — PANTOPRAZOLE SODIUM 40 MG: 40 TABLET, DELAYED RELEASE ORAL at 07:48

## 2018-02-08 RX ADMIN — HYDROCODONE BITARTRATE AND ACETAMINOPHEN 1 TABLET: 10; 325 TABLET ORAL at 10:17

## 2018-02-08 RX ADMIN — IPRATROPIUM BROMIDE AND ALBUTEROL SULFATE 1 AMPULE: .5; 3 SOLUTION RESPIRATORY (INHALATION) at 19:55

## 2018-02-08 RX ADMIN — TAMSULOSIN HYDROCHLORIDE 0.8 MG: 0.4 CAPSULE ORAL at 08:41

## 2018-02-08 RX ADMIN — ENOXAPARIN SODIUM 40 MG: 40 INJECTION SUBCUTANEOUS at 08:41

## 2018-02-08 RX ADMIN — DOCUSATE SODIUM 100 MG: 100 CAPSULE, LIQUID FILLED ORAL at 21:25

## 2018-02-08 RX ADMIN — ALPRAZOLAM 1 MG: 1 TABLET ORAL at 04:04

## 2018-02-08 RX ADMIN — ALPRAZOLAM 1 MG: 1 TABLET ORAL at 21:25

## 2018-02-08 RX ADMIN — IPRATROPIUM BROMIDE AND ALBUTEROL SULFATE 1 AMPULE: .5; 3 SOLUTION RESPIRATORY (INHALATION) at 10:08

## 2018-02-08 RX ADMIN — HYDROCODONE BITARTRATE AND ACETAMINOPHEN 1 TABLET: 10; 325 TABLET ORAL at 19:24

## 2018-02-08 RX ADMIN — DOCUSATE SODIUM 100 MG: 100 CAPSULE, LIQUID FILLED ORAL at 08:41

## 2018-02-08 RX ADMIN — IPRATROPIUM BROMIDE AND ALBUTEROL SULFATE 1 AMPULE: .5; 3 SOLUTION RESPIRATORY (INHALATION) at 06:17

## 2018-02-08 RX ADMIN — ACETAMINOPHEN 650 MG: 325 TABLET, FILM COATED ORAL at 23:55

## 2018-02-08 RX ADMIN — Medication 10 ML: at 08:42

## 2018-02-08 ASSESSMENT — PAIN DESCRIPTION - DESCRIPTORS
DESCRIPTORS: DISCOMFORT;SPASM
DESCRIPTORS: DISCOMFORT;SPASM

## 2018-02-08 ASSESSMENT — PAIN DESCRIPTION - PROGRESSION
CLINICAL_PROGRESSION: GRADUALLY WORSENING
CLINICAL_PROGRESSION: GRADUALLY WORSENING

## 2018-02-08 ASSESSMENT — PAIN DESCRIPTION - LOCATION
LOCATION: BACK

## 2018-02-08 ASSESSMENT — PAIN SCALES - GENERAL
PAINLEVEL_OUTOF10: 0
PAINLEVEL_OUTOF10: 1
PAINLEVEL_OUTOF10: 6
PAINLEVEL_OUTOF10: 5
PAINLEVEL_OUTOF10: 6
PAINLEVEL_OUTOF10: 6
PAINLEVEL_OUTOF10: 0
PAINLEVEL_OUTOF10: 0
PAINLEVEL_OUTOF10: 6
PAINLEVEL_OUTOF10: 6
PAINLEVEL_OUTOF10: 0
PAINLEVEL_OUTOF10: 0
PAINLEVEL_OUTOF10: 5

## 2018-02-08 ASSESSMENT — PAIN DESCRIPTION - PAIN TYPE
TYPE: CHRONIC PAIN

## 2018-02-08 ASSESSMENT — PAIN DESCRIPTION - ONSET
ONSET: GRADUAL
ONSET: GRADUAL

## 2018-02-08 ASSESSMENT — PAIN DESCRIPTION - FREQUENCY
FREQUENCY: INTERMITTENT
FREQUENCY: INTERMITTENT

## 2018-02-08 NOTE — PROGRESS NOTES
Department of Urology  Physician Assistant Progress Note  Joan Rivera PA-C      SUBJECTIVE:  No  complaints. Complains of some back pain. ROS:  Constitutional:Negative  Respiratory:Negative  CV:Negative  GI:Negative  :Negative  MS: Has some back pain. OBJECTIVE:  Catheter draining yellow urine not bloody. Physical  VITALS:  BP (!) 123/36   Pulse 87   Temp 98.7 °F (37.1 °C) (Temporal)   Resp 15   Ht 5' 5\" (1.651 m)   Wt 141 lb 12.8 oz (64.3 kg)   SpO2 90%   BMI 23.60 kg/m²   TEMPERATURE:  Current - Temp: 98.7 °F (37.1 °C); Max - Temp  Av.3 °F (36.8 °C)  Min: 97 °F (36.1 °C)  Max: 99.2 °F (37.3 °C)  CONSTITUTIONAL:  awake, alert, cooperative, no apparent distress, and appears stated age  ABDOMEN:  No scars, normal bowel sounds, soft, non-distended, non-tender, no masses palpated, no hepatosplenomegally  GENITAL/URINARY:  Catheter in urethral meatus. No swelling  SKIN:  no rashes    Data  CBC:   Lab Results   Component Value Date    WBC 9.2 2018    RBC 3.37 2018    HGB 10.7 2018    HCT 31.2 2018    MCV 92.6 2018    MCH 31.8 2018    MCHC 34.3 2018    RDW 12.1 2018     2018    MPV 9.0 2018     CMP:    Lab Results   Component Value Date     2018    K 3.9 2018    CL 89 2018    CO2 26 2018    BUN 12 2018    CREATININE 0.9 2018    LABGLOM >60 2018    GLUCOSE 154 2018    PROT 6.4 2018    LABALBU 3.8 2018    CALCIUM 8.0 2018    BILITOT 0.6 2018    ALKPHOS 72 2018    AST 18 2018    ALT 9 2018       ASSESSMENT AND PLAN    Severe hyponatremia- Improving now 126. HTN  Anemia     1. Presumed Urinary Retention- Residual when catheter was placed was 200 ml. He is on maximum dose of tamsulosin 0.8 and was started on Finasteride by Dr. Hay Vega yesterday. Recommend leaving soliz catheter in for bladder decompression.  Catheter removal and

## 2018-02-08 NOTE — PROGRESS NOTES
LABA1C in the last 72 hours. Hepatic: Recent Labs      02/07/18   0309   ALKPHOS  72   ALT  9   AST  18   PROT  6.4*   BILITOT  0.6   LABALBU  3.8     Lactic Acid: No results for input(s): LACTA in the last 72 hours. Troponin: No results for input(s): CKTOTAL, CKMB, TROPONINT in the last 72 hours. ABGs: No results found for: PHART, PO2ART, KRI4NSG  CRP:  No results for input(s): CRP in the last 72 hours. Sed Rate:  No results for input(s): SEDRATE in the last 72 hours. Culture Results:   Blood Culture Recent: No results for input(s): BC in the last 720 hours. Urine Culture Recent : Recent Labs      02/07/18   [address] [id number]  No growth       Radiology reports as per the Radiologist  Radiology: No results found.      Assessment   Severe hyponatremia  Urinary retention with bladder outlet obstruction   HTN  Anemia     Plan:  D/w pt/RN  W/u ordered and ongoing  Monitor na frequently  Goal correction of 6 meq per day  Ok to floor on fluid restriction    Shivam Ray MD  02/08/18  10:47 AM

## 2018-02-08 NOTE — PROGRESS NOTES
Nutrition Assessment    Type and Reason for Visit: Initial, Positive Nutrition Screen    Nutrition Recommendations: continue current POC. Malnutrition Assessment:  · Malnutrition Status: No malnutrition    Nutrition Diagnosis:   · Problem: Inadequate oral intake  · Etiology: related to Acute injury/trauma     Signs and symptoms:  as evidenced by Patient report of    Nutrition Assessment:  · Subjective Assessment: +NS for n/v. Pt reports hes had no more episodes of n/v since he's been in the hospital. States n/v was due to his ride into the hospital. Reports appetite is okay. No po recorded, but states he only ate cereal for breakfast. Obtained food preferences. No wt hx. · Wound Type: None  · Current Nutrition Therapies:  · Oral Diet Orders: General, Fluid Restriction (800cc )   · Oral Diet intake: Unable to assess  · Oral Nutrition Supplement (ONS) Orders: None  · Anthropometric Measures:  · Ht: 5' 5\" (165.1 cm)   · Admission Body Wt: 141 lb 12 oz (64.3 kg)  · Ideal Body Wt: 136 lb (61.7 kg),  · BMI Classification: BMI 18.5 - 24.9 Normal Weight    Estimated Intake vs Estimated Needs: Intake Less Than Needs, Intake Improving    Nutrition Risk Level: Moderate    Nutrition Interventions:   Continue current diet  Continued Inpatient Monitoring    Nutrition Evaluation:   · Evaluation: Goals set   · Goals: po intake of 50% or more. · Monitoring: Meal Intake, Skin Integrity, Fluid Balance, Weight, Pertinent Labs    See Adult Nutrition Doc Flowsheet for more detail.      Electronically signed by Shayne Rodriguez RD, LD on 2/8/18 at 11:11 AM    Contact Number: [phone]

## 2018-02-08 NOTE — PLAN OF CARE
Problem: Nutrition  Goal: Optimal nutrition therapy  Nutrition Problem: Inadequate oral intake  Intervention: Food and/or Nutrient Delivery: Continue current diet  Nutritional Goals: po intake of 50% or more.    Outcome: Ongoing

## 2018-02-09 ENCOUNTER — APPOINTMENT (OUTPATIENT)
Dept: CT IMAGING | Age: 83
DRG: 641 | End: 2018-02-09
Attending: INTERNAL MEDICINE
Payer: MEDICARE

## 2018-02-09 LAB
ANION GAP SERPL CALCULATED.3IONS-SCNC: 10 MMOL/L (ref 7–19)
ANION GAP SERPL CALCULATED.3IONS-SCNC: 10 MMOL/L (ref 7–19)
ANION GAP SERPL CALCULATED.3IONS-SCNC: 11 MMOL/L (ref 7–19)
BUN BLDV-MCNC: 11 MG/DL (ref 8–23)
BUN BLDV-MCNC: 13 MG/DL (ref 8–23)
BUN BLDV-MCNC: 13 MG/DL (ref 8–23)
CALCIUM SERPL-MCNC: 7.9 MG/DL (ref 8.8–10.2)
CALCIUM SERPL-MCNC: 8 MG/DL (ref 8.8–10.2)
CALCIUM SERPL-MCNC: 8.2 MG/DL (ref 8.8–10.2)
CHLORIDE BLD-SCNC: 90 MMOL/L (ref 98–111)
CHLORIDE BLD-SCNC: 92 MMOL/L (ref 98–111)
CHLORIDE BLD-SCNC: 94 MMOL/L (ref 98–111)
CO2: 25 MMOL/L (ref 22–29)
CO2: 27 MMOL/L (ref 22–29)
CO2: 27 MMOL/L (ref 22–29)
CREAT SERPL-MCNC: 0.8 MG/DL (ref 0.5–1.2)
GFR NON-AFRICAN AMERICAN: >60
GLUCOSE BLD-MCNC: 105 MG/DL (ref 74–109)
GLUCOSE BLD-MCNC: 115 MG/DL (ref 74–109)
GLUCOSE BLD-MCNC: 135 MG/DL (ref 74–109)
OSMOLALITY URINE: 290 MOSM/KG (ref 250–1200)
POTASSIUM REFLEX MAGNESIUM: 4.1 MMOL/L (ref 3.5–5)
POTASSIUM REFLEX MAGNESIUM: 4.3 MMOL/L (ref 3.5–5)
POTASSIUM REFLEX MAGNESIUM: 4.4 MMOL/L (ref 3.5–5)
SODIUM BLD-SCNC: 128 MMOL/L (ref 136–145)
SODIUM BLD-SCNC: 129 MMOL/L (ref 136–145)
SODIUM BLD-SCNC: 129 MMOL/L (ref 136–145)
SODIUM URINE: 76 MMOL/L
URINE CULTURE, ROUTINE: NORMAL

## 2018-02-09 PROCEDURE — 71250 CT THORAX DX C-: CPT

## 2018-02-09 PROCEDURE — 99232 SBSQ HOSP IP/OBS MODERATE 35: CPT | Performed by: INTERNAL MEDICINE

## 2018-02-09 PROCEDURE — 83935 ASSAY OF URINE OSMOLALITY: CPT

## 2018-02-09 PROCEDURE — 84300 ASSAY OF URINE SODIUM: CPT

## 2018-02-09 PROCEDURE — 6370000000 HC RX 637 (ALT 250 FOR IP): Performed by: INTERNAL MEDICINE

## 2018-02-09 PROCEDURE — 6370000000 HC RX 637 (ALT 250 FOR IP): Performed by: UROLOGY

## 2018-02-09 PROCEDURE — 99232 SBSQ HOSP IP/OBS MODERATE 35: CPT | Performed by: PHYSICIAN ASSISTANT

## 2018-02-09 PROCEDURE — 2700000000 HC OXYGEN THERAPY PER DAY

## 2018-02-09 PROCEDURE — 36415 COLL VENOUS BLD VENIPUNCTURE: CPT

## 2018-02-09 PROCEDURE — 1210000000 HC MED SURG R&B

## 2018-02-09 PROCEDURE — 2580000003 HC RX 258: Performed by: INTERNAL MEDICINE

## 2018-02-09 PROCEDURE — 6360000002 HC RX W HCPCS: Performed by: INTERNAL MEDICINE

## 2018-02-09 PROCEDURE — 94640 AIRWAY INHALATION TREATMENT: CPT

## 2018-02-09 PROCEDURE — 80048 BASIC METABOLIC PNL TOTAL CA: CPT

## 2018-02-09 RX ORDER — DOCUSATE SODIUM 100 MG/1
200 CAPSULE, LIQUID FILLED ORAL 2 TIMES DAILY
Status: DISCONTINUED | OUTPATIENT
Start: 2018-02-09 | End: 2018-02-11 | Stop reason: HOSPADM

## 2018-02-09 RX ADMIN — HYDROCODONE BITARTRATE AND ACETAMINOPHEN 1 TABLET: 10; 325 TABLET ORAL at 02:29

## 2018-02-09 RX ADMIN — HYDROCODONE BITARTRATE AND ACETAMINOPHEN 1 TABLET: 10; 325 TABLET ORAL at 23:52

## 2018-02-09 RX ADMIN — ASPIRIN 81 MG: 81 TABLET, COATED ORAL at 08:42

## 2018-02-09 RX ADMIN — ALPRAZOLAM 1 MG: 1 TABLET ORAL at 10:53

## 2018-02-09 RX ADMIN — ALPRAZOLAM 1 MG: 1 TABLET ORAL at 04:57

## 2018-02-09 RX ADMIN — DOCUSATE SODIUM 100 MG: 100 CAPSULE, LIQUID FILLED ORAL at 08:42

## 2018-02-09 RX ADMIN — TAMSULOSIN HYDROCHLORIDE 0.8 MG: 0.4 CAPSULE ORAL at 08:42

## 2018-02-09 RX ADMIN — IPRATROPIUM BROMIDE AND ALBUTEROL SULFATE 1 AMPULE: .5; 3 SOLUTION RESPIRATORY (INHALATION) at 15:48

## 2018-02-09 RX ADMIN — ENOXAPARIN SODIUM 40 MG: 40 INJECTION SUBCUTANEOUS at 08:42

## 2018-02-09 RX ADMIN — IPRATROPIUM BROMIDE AND ALBUTEROL SULFATE 1 AMPULE: .5; 3 SOLUTION RESPIRATORY (INHALATION) at 06:24

## 2018-02-09 RX ADMIN — ALPRAZOLAM 1 MG: 1 TABLET ORAL at 19:01

## 2018-02-09 RX ADMIN — PANTOPRAZOLE SODIUM 40 MG: 40 TABLET, DELAYED RELEASE ORAL at 07:09

## 2018-02-09 RX ADMIN — IPRATROPIUM BROMIDE AND ALBUTEROL SULFATE 1 AMPULE: .5; 3 SOLUTION RESPIRATORY (INHALATION) at 18:32

## 2018-02-09 RX ADMIN — AMLODIPINE BESYLATE 10 MG: 10 TABLET ORAL at 08:42

## 2018-02-09 RX ADMIN — BISACODYL 10 MG: 10 SUPPOSITORY RECTAL at 20:06

## 2018-02-09 RX ADMIN — Medication 10 ML: at 08:46

## 2018-02-09 RX ADMIN — PHENYTOIN SODIUM 200 MG: 100 CAPSULE ORAL at 20:49

## 2018-02-09 RX ADMIN — FINASTERIDE 5 MG: 5 TABLET, FILM COATED ORAL at 08:42

## 2018-02-09 RX ADMIN — DOCUSATE SODIUM 200 MG: 100 CAPSULE, LIQUID FILLED ORAL at 20:49

## 2018-02-09 RX ADMIN — HYDROCODONE BITARTRATE AND ACETAMINOPHEN 1 TABLET: 10; 325 TABLET ORAL at 13:52

## 2018-02-09 RX ADMIN — PHENYTOIN SODIUM 200 MG: 100 CAPSULE ORAL at 08:43

## 2018-02-09 ASSESSMENT — PAIN SCALES - GENERAL
PAINLEVEL_OUTOF10: 6
PAINLEVEL_OUTOF10: 6
PAINLEVEL_OUTOF10: 7
PAINLEVEL_OUTOF10: 3

## 2018-02-09 ASSESSMENT — PAIN DESCRIPTION - LOCATION: LOCATION: BACK

## 2018-02-09 ASSESSMENT — PAIN DESCRIPTION - PAIN TYPE: TYPE: CHRONIC PAIN

## 2018-02-09 NOTE — PROGRESS NOTES
Currently     Other Topics Concern    Not on file     Social History Narrative    No narrative on file         Review of Systems:  History obtained from chart review and the patient  General ROS: No fever or chills  Respiratory ROS: No cough, shortness of breath, wheezing  Cardiovascular ROS: No chest pain or palpitations  Gastrointestinal ROS: No vomiting or melena  Genito-Urinary ROS: +dysuria/urgency on admit  Musculoskeletal ROS: No joint pain or swelling         Objective:  Blood pressure (!) 159/70, pulse 79, temperature 97.6 °F (36.4 °C), resp. rate 16, height 5' 5\" (1.651 m), weight 142 lb 6.4 oz (64.6 kg), SpO2 94 %. Intake/Output Summary (Last 24 hours) at 02/09/18 1004  Last data filed at 02/09/18 0930   Gross per 24 hour   Intake              670 ml   Output             3378 ml   Net            -2708 ml     General: awake/alert   Chest:  clear to auscultation bilaterally without respiratory distress  CVS: regular rate and rhythm  Abdominal: soft, nontender, normal bowel sounds  Extremities: no cyanosis or edema  Skin: warm and dry without rash    Labs:  BMP:   Recent Labs      02/08/18   1540  02/09/18   0005  02/09/18   0753   NA  126*  128*  129*   K  3.9  4.3  4.4   CL  89*  90*  92*   CO2  26  27  27   BUN  12  13  11   CREATININE  0.9  0.8  0.8   CALCIUM  8.0*  8.0*  8.2*     CBC:   Recent Labs      02/07/18   0309  02/08/18   0355   WBC  14.7*  9.2   HGB  11.0*  10.7*   HCT  31.8*  31.2*   MCV  91.4  92.6   PLT  230  214     LIVER PROFILE:   Recent Labs      02/07/18   0309   AST  18   ALT  9   BILITOT  0.6   ALKPHOS  72     PT/INR: No results for input(s): PROTIME, INR in the last 72 hours. APTT: No results for input(s): APTT in the last 72 hours. BNP:  No results for input(s): BNP in the last 72 hours. Ionized Calcium:No results for input(s): IONCA in the last 72 hours.   Magnesium:  Recent Labs      02/07/18   0309   MG  1.6     Phosphorus:No results for input(s): PHOS in the last 72 hours. HgbA1C: No results for input(s): LABA1C in the last 72 hours. Hepatic:   Recent Labs      02/07/18   0309   ALKPHOS  72   ALT  9   AST  18   PROT  6.4*   BILITOT  0.6   LABALBU  3.8     Lactic Acid: No results for input(s): LACTA in the last 72 hours. Troponin: No results for input(s): CKTOTAL, CKMB, TROPONINT in the last 72 hours. ABGs: No results found for: PHART, PO2ART, [id number]  CRP:  No results for input(s): CRP in the last 72 hours. Sed Rate:  No results for input(s): SEDRATE in the last 72 hours. Culture Results:   Blood Culture Recent: No results for input(s): BC in the last 720 hours. Urine Culture Recent :   Recent Labs      02/07/18   1155   LABURIN  No growth at 36-48 hours       Radiology reports as per the Radiologist  Radiology: No results found.      Assessment   Severe hyponatremia  Urinary retention with bladder outlet obstruction   HTN  Anemia  Lung nodules  constipation     Plan:  D/w pt/RN/IM  Monitor na frequently  Goal correction of 6 meq per day  continue fluid restriction  D/w radiologist, requesting regular resolution, noncontrast CT, have ordered    Deisy Hyatt MD  02/09/18  10:04 AM

## 2018-02-09 NOTE — PLAN OF CARE
Problem: Falls - Risk of  Goal: Absence of falls  Outcome: Ongoing      Problem: Pain:  Goal: Pain level will decrease  Pain level will decrease   Outcome: Ongoing    Goal: Control of acute pain  Control of acute pain   Outcome: Ongoing    Goal: Control of chronic pain  Control of chronic pain   Outcome: Ongoing      Problem: Risk for Impaired Skin Integrity  Goal: Tissue integrity - skin and mucous membranes  Structural intactness and normal physiological function of skin and  mucous membranes. Outcome: Ongoing      Problem: Nutrition  Goal: Optimal nutrition therapy  Nutrition Problem: Inadequate oral intake  Intervention: Food and/or Nutrient Delivery: Continue current diet  Nutritional Goals: po intake of 50% or more.     Outcome: Ongoing

## 2018-02-09 NOTE — PROGRESS NOTES
Patient transferred from ICU. Patient is alert, and oriented. Patient is oriented to room, call system within reach. Bed alarm on, patient instructed to call for assistance. No distress noted.

## 2018-02-09 NOTE — CARE COORDINATION
SW attempted visit with Pt re: d/c planning.     Pt not in room    Electronically signed by VIVIANA Thornton on 2/9/2018 at 2:56 PM

## 2018-02-10 LAB
ANION GAP SERPL CALCULATED.3IONS-SCNC: 11 MMOL/L (ref 7–19)
ANION GAP SERPL CALCULATED.3IONS-SCNC: 8 MMOL/L (ref 7–19)
ANION GAP SERPL CALCULATED.3IONS-SCNC: 9 MMOL/L (ref 7–19)
BUN BLDV-MCNC: 11 MG/DL (ref 8–23)
BUN BLDV-MCNC: 12 MG/DL (ref 8–23)
BUN BLDV-MCNC: 14 MG/DL (ref 8–23)
CALCIUM SERPL-MCNC: 8 MG/DL (ref 8.8–10.2)
CALCIUM SERPL-MCNC: 8.1 MG/DL (ref 8.8–10.2)
CALCIUM SERPL-MCNC: 8.1 MG/DL (ref 8.8–10.2)
CHLORIDE BLD-SCNC: 91 MMOL/L (ref 98–111)
CHLORIDE BLD-SCNC: 94 MMOL/L (ref 98–111)
CHLORIDE BLD-SCNC: 95 MMOL/L (ref 98–111)
CO2: 25 MMOL/L (ref 22–29)
CO2: 26 MMOL/L (ref 22–29)
CO2: 26 MMOL/L (ref 22–29)
CREAT SERPL-MCNC: 0.7 MG/DL (ref 0.5–1.2)
GFR NON-AFRICAN AMERICAN: >60
GLUCOSE BLD-MCNC: 104 MG/DL (ref 74–109)
GLUCOSE BLD-MCNC: 120 MG/DL (ref 74–109)
GLUCOSE BLD-MCNC: 125 MG/DL (ref 74–109)
POTASSIUM REFLEX MAGNESIUM: 3.9 MMOL/L (ref 3.5–5)
POTASSIUM REFLEX MAGNESIUM: 4.2 MMOL/L (ref 3.5–5)
POTASSIUM REFLEX MAGNESIUM: 4.5 MMOL/L (ref 3.5–5)
SODIUM BLD-SCNC: 126 MMOL/L (ref 136–145)
SODIUM BLD-SCNC: 129 MMOL/L (ref 136–145)
SODIUM BLD-SCNC: 130 MMOL/L (ref 136–145)

## 2018-02-10 PROCEDURE — 2700000000 HC OXYGEN THERAPY PER DAY

## 2018-02-10 PROCEDURE — 6370000000 HC RX 637 (ALT 250 FOR IP): Performed by: INTERNAL MEDICINE

## 2018-02-10 PROCEDURE — 6370000000 HC RX 637 (ALT 250 FOR IP): Performed by: UROLOGY

## 2018-02-10 PROCEDURE — 6360000002 HC RX W HCPCS: Performed by: INTERNAL MEDICINE

## 2018-02-10 PROCEDURE — 1210000000 HC MED SURG R&B

## 2018-02-10 PROCEDURE — 99231 SBSQ HOSP IP/OBS SF/LOW 25: CPT | Performed by: UROLOGY

## 2018-02-10 PROCEDURE — 80048 BASIC METABOLIC PNL TOTAL CA: CPT

## 2018-02-10 PROCEDURE — 99232 SBSQ HOSP IP/OBS MODERATE 35: CPT | Performed by: INTERNAL MEDICINE

## 2018-02-10 PROCEDURE — 2580000003 HC RX 258: Performed by: INTERNAL MEDICINE

## 2018-02-10 PROCEDURE — 36415 COLL VENOUS BLD VENIPUNCTURE: CPT

## 2018-02-10 PROCEDURE — 94640 AIRWAY INHALATION TREATMENT: CPT

## 2018-02-10 RX ADMIN — Medication 10 ML: at 21:22

## 2018-02-10 RX ADMIN — TAMSULOSIN HYDROCHLORIDE 0.8 MG: 0.4 CAPSULE ORAL at 09:21

## 2018-02-10 RX ADMIN — ENOXAPARIN SODIUM 40 MG: 40 INJECTION SUBCUTANEOUS at 09:21

## 2018-02-10 RX ADMIN — Medication 10 ML: at 09:21

## 2018-02-10 RX ADMIN — HYDROCODONE BITARTRATE AND ACETAMINOPHEN 1 TABLET: 10; 325 TABLET ORAL at 09:25

## 2018-02-10 RX ADMIN — ALPRAZOLAM 1 MG: 1 TABLET ORAL at 21:27

## 2018-02-10 RX ADMIN — PANTOPRAZOLE SODIUM 40 MG: 40 TABLET, DELAYED RELEASE ORAL at 06:20

## 2018-02-10 RX ADMIN — DOCUSATE SODIUM 200 MG: 100 CAPSULE, LIQUID FILLED ORAL at 09:21

## 2018-02-10 RX ADMIN — ALPRAZOLAM 1 MG: 1 TABLET ORAL at 03:39

## 2018-02-10 RX ADMIN — IPRATROPIUM BROMIDE AND ALBUTEROL SULFATE 1 AMPULE: .5; 3 SOLUTION RESPIRATORY (INHALATION) at 10:53

## 2018-02-10 RX ADMIN — FINASTERIDE 5 MG: 5 TABLET, FILM COATED ORAL at 09:20

## 2018-02-10 RX ADMIN — PHENYTOIN SODIUM 200 MG: 100 CAPSULE ORAL at 09:20

## 2018-02-10 RX ADMIN — HYDROCODONE BITARTRATE AND ACETAMINOPHEN 1 TABLET: 10; 325 TABLET ORAL at 15:59

## 2018-02-10 RX ADMIN — PHENYTOIN SODIUM 200 MG: 100 CAPSULE ORAL at 21:22

## 2018-02-10 RX ADMIN — AMLODIPINE BESYLATE 10 MG: 10 TABLET ORAL at 09:20

## 2018-02-10 RX ADMIN — IPRATROPIUM BROMIDE AND ALBUTEROL SULFATE 1 AMPULE: .5; 3 SOLUTION RESPIRATORY (INHALATION) at 19:27

## 2018-02-10 RX ADMIN — IPRATROPIUM BROMIDE AND ALBUTEROL SULFATE 1 AMPULE: .5; 3 SOLUTION RESPIRATORY (INHALATION) at 06:54

## 2018-02-10 RX ADMIN — DOCUSATE SODIUM 200 MG: 100 CAPSULE, LIQUID FILLED ORAL at 21:21

## 2018-02-10 RX ADMIN — ASPIRIN 81 MG: 81 TABLET, COATED ORAL at 09:21

## 2018-02-10 RX ADMIN — IPRATROPIUM BROMIDE AND ALBUTEROL SULFATE 1 AMPULE: .5; 3 SOLUTION RESPIRATORY (INHALATION) at 15:02

## 2018-02-10 ASSESSMENT — PAIN SCALES - GENERAL
PAINLEVEL_OUTOF10: 8
PAINLEVEL_OUTOF10: 6

## 2018-02-10 NOTE — PLAN OF CARE
Problem: Falls - Risk of  Goal: Absence of falls  Outcome: Met This Shift      Problem: Pain:  Goal: Pain level will decrease  Pain level will decrease   Outcome: Ongoing    Goal: Control of acute pain  Control of acute pain   Outcome: Ongoing    Goal: Control of chronic pain  Control of chronic pain   Outcome: Ongoing      Problem: Risk for Impaired Skin Integrity  Goal: Tissue integrity - skin and mucous membranes  Structural intactness and normal physiological function of skin and  mucous membranes. Outcome: Met This Shift      Problem: Nutrition  Goal: Optimal nutrition therapy  Nutrition Problem: Inadequate oral intake  Intervention: Food and/or Nutrient Delivery: Continue current diet  Nutritional Goals: po intake of 50% or more.     Outcome: Met This Shift

## 2018-02-10 NOTE — PROGRESS NOTES
ampule Inhalation 4x daily Marisel Good MD   1 ampule at 02/10/18 1053    ALPRAZolam (XANAX) tablet 1 mg  1 mg Oral 4x Daily PRN Marisel Good MD   1 mg at 02/10/18 0339    amLODIPine (NORVASC) tablet 10 mg  10 mg Oral Daily Richi Hyman MD   10 mg at 02/10/18 0920    aspirin EC tablet 81 mg  81 mg Oral Daily Richi Hyman MD   81 mg at 02/10/18 0569    HYDROcodone-acetaminophen (NORCO)  MG per tablet 1 tablet  1 tablet Oral Q6H PRN Marisel Good MD   1 tablet at 02/10/18 3737    phenytoin (DILANTIN) ER capsule 200 mg  200 mg Oral BID Richi Hyman MD   200 mg at 02/10/18 0920    pantoprazole (PROTONIX) tablet 40 mg  40 mg Oral QAM AC Farheen Gordon MD   40 mg at 02/10/18 0230    finasteride (PROSCAR) tablet 5 mg  5 mg Oral Daily Gertrude Ha MD   5 mg at 02/10/18 0920    tamsulosin (FLOMAX) capsule 0.8 mg  0.8 mg Oral Daily Gertrude Ha MD   0.8 mg at 02/10/18 1957       Past Medical History:  Past Medical History:   Diagnosis Date    CAD (coronary artery disease)     [facility] [facility]     COPD [facility]chronic obstructive pulmonary disease) [facility] [facility]     Hyperlipidemia     Hypertension     MI (myocardial infarction)     with stent placement       Past Surgical History:  Past Surgical History:   Procedure Laterality Date    CHOLECYSTECTOMY      FRACTURE SURGERY      L femur    JOINT REPLACEMENT      L hip replacement       Family History  History reviewed. No pertinent family history. Social History  Social History     Social History    Marital status: Single     Spouse name: N/A    Number of children: N/A    Years of education: N/A     Occupational History    Not on file.      Social History Main Topics    Smoking status: Current Every Day Smoker     Packs/day: 0.50     Years: 75.00     Types: Cigarettes    Smokeless tobacco: Never Used    Alcohol use No    Drug use: No    Sexual activity: Not Currently     Other Topics input(s): LABA1C in the last 72 hours. Hepatic:   No results for input(s): ALKPHOS, ALT, AST, PROT, BILITOT, BILIDIR, LABALBU in the last 72 hours. Lactic Acid: No results for input(s): LACTA in the last 72 hours. Troponin: No results for input(s): CKTOTAL, CKMB, TROPONINT in the last 72 hours. ABGs: No results found for: PHART, PO2ART, [id number]  CRP:  No results for input(s): CRP in the last 72 hours. Sed Rate:  No results for input(s): SEDRATE in the last 72 hours. Culture Results:   Blood Culture Recent: No results for input(s): BC in the last 720 hours. Urine Culture Recent :   Recent Labs      02/07/18   1155   LABURIN  No growth at 36-48 hours       Radiology reports as per the Radiologist  Radiology: No results found.      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

## 2018-02-11 VITALS
OXYGEN SATURATION: 91 % | WEIGHT: 115.38 LBS | SYSTOLIC BLOOD PRESSURE: 152 MMHG | HEART RATE: 83 BPM | TEMPERATURE: 97.7 F | BODY MASS INDEX: 19.22 KG/M2 | DIASTOLIC BLOOD PRESSURE: 57 MMHG | HEIGHT: 65 IN | RESPIRATION RATE: 18 BRPM

## 2018-02-11 LAB
ANION GAP SERPL CALCULATED.3IONS-SCNC: 11 MMOL/L (ref 7–19)
ANION GAP SERPL CALCULATED.3IONS-SCNC: 12 MMOL/L (ref 7–19)
BUN BLDV-MCNC: 11 MG/DL (ref 8–23)
BUN BLDV-MCNC: 12 MG/DL (ref 8–23)
CALCIUM SERPL-MCNC: 7.9 MG/DL (ref 8.8–10.2)
CALCIUM SERPL-MCNC: 8.4 MG/DL (ref 8.8–10.2)
CHLORIDE BLD-SCNC: 95 MMOL/L (ref 98–111)
CHLORIDE BLD-SCNC: 95 MMOL/L (ref 98–111)
CO2: 24 MMOL/L (ref 22–29)
CO2: 25 MMOL/L (ref 22–29)
CREAT SERPL-MCNC: 0.7 MG/DL (ref 0.5–1.2)
CREAT SERPL-MCNC: 0.8 MG/DL (ref 0.5–1.2)
GFR NON-AFRICAN AMERICAN: >60
GFR NON-AFRICAN AMERICAN: >60
GLUCOSE BLD-MCNC: 112 MG/DL (ref 74–109)
GLUCOSE BLD-MCNC: 97 MG/DL (ref 74–109)
POTASSIUM REFLEX MAGNESIUM: 4 MMOL/L (ref 3.5–5)
POTASSIUM REFLEX MAGNESIUM: 4.5 MMOL/L (ref 3.5–5)
SODIUM BLD-SCNC: 130 MMOL/L (ref 136–145)
SODIUM BLD-SCNC: 132 MMOL/L (ref 136–145)
URIC ACID, SERUM: 3.5 MG/DL (ref 3.4–7)

## 2018-02-11 PROCEDURE — 6370000000 HC RX 637 (ALT 250 FOR IP): Performed by: INTERNAL MEDICINE

## 2018-02-11 PROCEDURE — 2580000003 HC RX 258: Performed by: INTERNAL MEDICINE

## 2018-02-11 PROCEDURE — 6360000002 HC RX W HCPCS: Performed by: INTERNAL MEDICINE

## 2018-02-11 PROCEDURE — 84550 ASSAY OF BLOOD/URIC ACID: CPT

## 2018-02-11 PROCEDURE — 80048 BASIC METABOLIC PNL TOTAL CA: CPT

## 2018-02-11 PROCEDURE — 99231 SBSQ HOSP IP/OBS SF/LOW 25: CPT | Performed by: UROLOGY

## 2018-02-11 PROCEDURE — 36415 COLL VENOUS BLD VENIPUNCTURE: CPT

## 2018-02-11 PROCEDURE — 6370000000 HC RX 637 (ALT 250 FOR IP): Performed by: UROLOGY

## 2018-02-11 PROCEDURE — 94640 AIRWAY INHALATION TREATMENT: CPT

## 2018-02-11 PROCEDURE — 99238 HOSP IP/OBS DSCHRG MGMT 30/<: CPT | Performed by: INTERNAL MEDICINE

## 2018-02-11 RX ORDER — FINASTERIDE 5 MG/1
5 TABLET, FILM COATED ORAL DAILY
Qty: 30 TABLET | Refills: 3 | Status: SHIPPED | OUTPATIENT
Start: 2018-02-12

## 2018-02-11 RX ADMIN — ASPIRIN 81 MG: 81 TABLET, COATED ORAL at 08:45

## 2018-02-11 RX ADMIN — ENOXAPARIN SODIUM 40 MG: 40 INJECTION SUBCUTANEOUS at 08:46

## 2018-02-11 RX ADMIN — TAMSULOSIN HYDROCHLORIDE 0.8 MG: 0.4 CAPSULE ORAL at 08:45

## 2018-02-11 RX ADMIN — PHENYTOIN SODIUM 200 MG: 100 CAPSULE ORAL at 08:45

## 2018-02-11 RX ADMIN — Medication 10 ML: at 08:46

## 2018-02-11 RX ADMIN — DOCUSATE SODIUM 200 MG: 100 CAPSULE, LIQUID FILLED ORAL at 08:45

## 2018-02-11 RX ADMIN — ALPRAZOLAM 1 MG: 1 TABLET ORAL at 08:45

## 2018-02-11 RX ADMIN — IPRATROPIUM BROMIDE AND ALBUTEROL SULFATE 1 AMPULE: .5; 3 SOLUTION RESPIRATORY (INHALATION) at 06:24

## 2018-02-11 RX ADMIN — FINASTERIDE 5 MG: 5 TABLET, FILM COATED ORAL at 08:45

## 2018-02-11 RX ADMIN — AMLODIPINE BESYLATE 10 MG: 10 TABLET ORAL at 08:45

## 2018-02-11 RX ADMIN — PANTOPRAZOLE SODIUM 40 MG: 40 TABLET, DELAYED RELEASE ORAL at 08:45

## 2018-02-11 RX ADMIN — IPRATROPIUM BROMIDE AND ALBUTEROL SULFATE 1 AMPULE: .5; 3 SOLUTION RESPIRATORY (INHALATION) at 10:17

## 2018-02-11 RX ADMIN — HYDROCODONE BITARTRATE AND ACETAMINOPHEN 1 TABLET: 10; 325 TABLET ORAL at 02:39

## 2018-02-11 ASSESSMENT — PAIN SCALES - GENERAL: PAINLEVEL_OUTOF10: 8

## 2018-02-11 NOTE — PROGRESS NOTES
file     Social History Narrative    No narrative on file         Review of Systems:  History obtained from chart review and the patient  General ROS: No fever or chills  Respiratory ROS: No cough, shortness of breath, wheezing  Cardiovascular ROS: No chest pain or palpitations  Gastrointestinal ROS: No vomiting or melena  Genito-Urinary ROS: +dysuria/urgency on admit  Musculoskeletal ROS: No joint pain or swelling         Objective:  Blood pressure (!) 152/57, pulse 83, temperature 97.7 °F (36.5 °C), temperature source Temporal, resp. rate 18, height 5' 5\" (1.651 m), weight 115 lb 6 oz (52.3 kg), SpO2 91 %. Intake/Output Summary (Last 24 hours) at 02/11/18 1407  Last data filed at 02/11/18 0600   Gross per 24 hour   Intake              600 ml   Output             1200 ml   Net             -600 ml     General: awake/alert   Chest:  clear to auscultation bilaterally without respiratory distress  CVS: regular rate and rhythm  Abdominal: soft, nontender, normal bowel sounds  Extremities: no cyanosis or edema  Skin: warm and dry without rash    Labs:  BMP:   Recent Labs      02/10/18   1615  02/11/18   0008  02/11/18   0817   NA  129*  130*  132*   K  4.5  4.0  4.5   CL  95*  95*  95*   CO2  26  24  25   BUN  12  12  11   CREATININE  0.7  0.7  0.8   CALCIUM  8.1*  7.9*  8.4*     CBC:   No results for input(s): WBC, HGB, HCT, MCV, PLT in the last 72 hours. LIVER PROFILE:   No results for input(s): AST, ALT, LIPASE, BILIDIR, BILITOT, ALKPHOS in the last 72 hours. Invalid input(s): AMYLASE,  ALB  PT/INR: No results for input(s): PROTIME, INR in the last 72 hours. APTT: No results for input(s): APTT in the last 72 hours. BNP:  No results for input(s): BNP in the last 72 hours. Ionized Calcium:No results for input(s): IONCA in the last 72 hours. Magnesium:  No results for input(s): MG in the last 72 hours. Phosphorus:No results for input(s): PHOS in the last 72 hours.   HgbA1C: No results for input(s): LABA1C in the last 72 hours. Hepatic:   No results for input(s): ALKPHOS, ALT, AST, PROT, BILITOT, BILIDIR, LABALBU in the last 72 hours. Lactic Acid: No results for input(s): LACTA in the last 72 hours. Troponin: No results for input(s): CKTOTAL, CKMB, TROPONINT in the last 72 hours. ABGs: No results found for: PHART, PO2ART, [id number]  CRP:  No results for input(s): CRP in the last 72 hours. Sed Rate:  No results for input(s): SEDRATE in the last 72 hours. Culture Results:   Blood Culture Recent: No results for input(s): BC in the last 720 hours. Urine Culture Recent :   Recent Labs      02/07/18   1155   LABURIN  No growth at 36-48 hours       Radiology reports as per the Radiologist  Radiology: No results found.      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

## 2018-02-11 NOTE — DISCHARGE INSTR - DIET

## 2018-02-14 ENCOUNTER — OFFICE VISIT (OUTPATIENT)
Dept: UROLOGY | Age: 83
End: 2018-02-14
Payer: MEDICARE

## 2018-02-14 VITALS — HEIGHT: 65 IN | WEIGHT: 122 LBS | TEMPERATURE: 97.8 F | BODY MASS INDEX: 20.33 KG/M2

## 2018-02-14 DIAGNOSIS — R33.9 URINARY RETENTION: Primary | ICD-10-CM

## 2018-02-14 DIAGNOSIS — N40.1 BENIGN PROSTATIC HYPERPLASIA WITH URINARY OBSTRUCTION: ICD-10-CM

## 2018-02-14 DIAGNOSIS — N13.8 BENIGN PROSTATIC HYPERPLASIA WITH URINARY OBSTRUCTION: ICD-10-CM

## 2018-02-14 PROCEDURE — 51700 IRRIGATION OF BLADDER: CPT | Performed by: PHYSICIAN ASSISTANT

## 2018-02-14 PROCEDURE — 1123F ACP DISCUSS/DSCN MKR DOCD: CPT | Performed by: PHYSICIAN ASSISTANT

## 2018-02-14 PROCEDURE — G8484 FLU IMMUNIZE NO ADMIN: HCPCS | Performed by: PHYSICIAN ASSISTANT

## 2018-02-14 PROCEDURE — 4040F PNEUMOC VAC/ADMIN/RCVD: CPT | Performed by: PHYSICIAN ASSISTANT

## 2018-02-14 PROCEDURE — G8420 CALC BMI NORM PARAMETERS: HCPCS | Performed by: PHYSICIAN ASSISTANT

## 2018-02-14 PROCEDURE — 99999 PR IRRIGATION OF BLADDER: CPT | Performed by: PHYSICIAN ASSISTANT

## 2018-02-14 PROCEDURE — 99213 OFFICE O/P EST LOW 20 MIN: CPT | Performed by: PHYSICIAN ASSISTANT

## 2018-02-14 PROCEDURE — 4004F PT TOBACCO SCREEN RCVD TLK: CPT | Performed by: PHYSICIAN ASSISTANT

## 2018-02-14 PROCEDURE — 1111F DSCHRG MED/CURRENT MED MERGE: CPT | Performed by: PHYSICIAN ASSISTANT

## 2018-02-14 PROCEDURE — G8427 DOCREV CUR MEDS BY ELIG CLIN: HCPCS | Performed by: PHYSICIAN ASSISTANT

## 2018-02-14 RX ORDER — PANTOPRAZOLE SODIUM 40 MG/1
TABLET, DELAYED RELEASE ORAL
COMMUNITY
Start: 2018-02-01

## 2018-02-14 ASSESSMENT — ENCOUNTER SYMPTOMS
EYE REDNESS: 0
NAUSEA: 0
WHEEZING: 0
SHORTNESS OF BREATH: 0
HEARTBURN: 0
EYE DISCHARGE: 0

## 2018-02-28 ENCOUNTER — OFFICE VISIT (OUTPATIENT)
Dept: UROLOGY | Age: 83
End: 2018-02-28
Payer: MEDICARE

## 2018-02-28 VITALS
SYSTOLIC BLOOD PRESSURE: 134 MMHG | DIASTOLIC BLOOD PRESSURE: 84 MMHG | BODY MASS INDEX: 22.49 KG/M2 | HEIGHT: 65 IN | TEMPERATURE: 98.6 F | WEIGHT: 135 LBS

## 2018-02-28 DIAGNOSIS — R33.9 URINARY RETENTION: Primary | ICD-10-CM

## 2018-02-28 DIAGNOSIS — R80.9 PROTEINURIA, UNSPECIFIED TYPE: ICD-10-CM

## 2018-02-28 LAB
APPEARANCE FLUID: CLEAR
BILIRUBIN, POC: 0
BLOOD URINE, POC: NORMAL
CLARITY, POC: CLEAR
COLOR, POC: YELLOW
GLUCOSE URINE, POC: NORMAL
KETONES, POC: NORMAL
LEUKOCYTE EST, POC: NORMAL
NITRITE, POC: NORMAL
PH, POC: 5.5
PROTEIN, POC: POSITIVE
SPECIFIC GRAVITY, POC: 1.02
UROBILINOGEN, POC: 0.2

## 2018-02-28 PROCEDURE — G8420 CALC BMI NORM PARAMETERS: HCPCS | Performed by: PHYSICIAN ASSISTANT

## 2018-02-28 PROCEDURE — 51798 US URINE CAPACITY MEASURE: CPT | Performed by: PHYSICIAN ASSISTANT

## 2018-02-28 PROCEDURE — 1123F ACP DISCUSS/DSCN MKR DOCD: CPT | Performed by: PHYSICIAN ASSISTANT

## 2018-02-28 PROCEDURE — G8484 FLU IMMUNIZE NO ADMIN: HCPCS | Performed by: PHYSICIAN ASSISTANT

## 2018-02-28 PROCEDURE — 4040F PNEUMOC VAC/ADMIN/RCVD: CPT | Performed by: PHYSICIAN ASSISTANT

## 2018-02-28 PROCEDURE — 1111F DSCHRG MED/CURRENT MED MERGE: CPT | Performed by: PHYSICIAN ASSISTANT

## 2018-02-28 PROCEDURE — 99213 OFFICE O/P EST LOW 20 MIN: CPT | Performed by: PHYSICIAN ASSISTANT

## 2018-02-28 PROCEDURE — G8427 DOCREV CUR MEDS BY ELIG CLIN: HCPCS | Performed by: PHYSICIAN ASSISTANT

## 2018-02-28 PROCEDURE — 4004F PT TOBACCO SCREEN RCVD TLK: CPT | Performed by: PHYSICIAN ASSISTANT

## 2018-02-28 PROCEDURE — 81003 URINALYSIS AUTO W/O SCOPE: CPT | Performed by: PHYSICIAN ASSISTANT

## 2018-02-28 ASSESSMENT — ENCOUNTER SYMPTOMS
BACK PAIN: 0
EYE PAIN: 0
SHORTNESS OF BREATH: 0
VOMITING: 0
SINUS PAIN: 0
ABDOMINAL PAIN: 0
WHEEZING: 0
BLURRED VISION: 0